# Patient Record
(demographics unavailable — no encounter records)

---

## 2025-07-02 NOTE — PHYSICAL EXAM
[MA] : MA [Appropriately responsive] : appropriately responsive [Alert] : alert [No Acute Distress] : no acute distress [No Lymphadenopathy] : no lymphadenopathy [Regular Rate Rhythm] : regular rate rhythm [No Murmurs] : no murmurs [Clear to Auscultation B/L] : clear to auscultation bilaterally [Soft] : soft [Non-tender] : non-tender [Non-distended] : non-distended [No HSM] : No HSM [No Lesions] : no lesions [No Mass] : no mass [Oriented x3] : oriented x3 [Examination Of The Breasts] : a normal appearance [No Masses] : no breast masses were palpable [Labia Majora] : normal [Labia Minora] : normal [Atrophy] : atrophy [Cystocele] : a cystocele [Uterine Prolapse] : uterine prolapse [No Bleeding] : There was no active vaginal bleeding [Normal] : normal [Uterine Adnexae] : normal [FreeTextEntry2] : Deana [Tenderness] : nontender [Enlarged ___ wks] : not enlarged [FreeTextEntry3] : Small possible carbuncle on the anterior vaginal wall just below the urethra nontender [FreeTextEntry4] :  mild cystocele with Valsalva

## 2025-07-02 NOTE — HISTORY OF PRESENT ILLNESS
[N] : Patient does not use contraception [Y] : Positive pregnancy history [Menarche Age: ____] : age at menarche was [unfilled] [No] : Patient does not have concerns regarding sex [Currently Active] : currently active [Men] : men [postmenopausal] : postmenopausal [Mammogramdate] : 04/01/2025 [TextBox_19] : BR2 [PapSmeardate] : 01/24/2018 [TextBox_31] : NEG [ColonoscopyDate] : 11/2024 [TextBox_43] : WNL PER PT [HPVDate] : 01/24/2018 [TextBox_78] : NEG  [LMPDate] : 2010 [PGHxTotal] : 2 [[facility] : [facility] [[facility] : 2 [FreeTextEntry1] : 2010

## 2025-07-02 NOTE — DISCUSSION/SUMMARY
[FreeTextEntry1] : Assessment is mild cystocele and atrophic vaginitis.  Explained the patient that she could either use a pessary or do nothing until she has urinary issues.  Can be evaluated for surgery if she desires.  She states at this point it is not changing her lifestyle does not wish to do surgery or use a pessary.  Explained that she should try Kegel exercises to see if that can help strengthen the pelvic floor.  She is up-to-date with mammography and colonoscopy.  Medical assistant present in the room during entire visit